# Patient Record
(demographics unavailable — no encounter records)

---

## 2024-11-26 NOTE — HISTORY OF PRESENT ILLNESS
[FreeTextEntry1] : History of present illness 52-year-old Bengali-speaking female patient with a history of hypertension who had negative EKG stress test in 2019 (8 METS), as well as echocardiogram 11/2021 showing severe TR with normal RVSP (unchanged from 2019). To assess right-sided pathology, she underwent cardiac MRI 09/2019 showing no shunt and dilated RA and RV. Right heart catheterization 02/2022 confirmed normal PA pressures. Patient has been experiencing heart palpitations for the past month, which have become more frequent in the last two weeks. The palpitations occur daily and can last for varying durations, from seconds to hours. The patient also reports episodes of shortness of breath and feeling faint, but no loss of consciousness. The palpitations can occur at any time, and the patient has woken up in the middle of the night feeling short of breath. The patient also reports feeling short of breath when climbing stairs.  Past medical history Hypertension  Review of Symptoms  Constitutional, Visual, Respiratory, Cardiovascular, Gastrointestinal, Genitourinary, Neurologic, Integumentary, Endocrine, Musculoskeletal, Psychiatric, and Hematologic systems reviewed and are all negative except as in HPI.  Family history No family history of premature CAD  Social history No smoking or alcohol use  Current medications Rosuvastatin 10mg for cholesterol  Physical exam EXTREMITIES: Mild edema in feet.  Unless superseded by above, rest of exam was normal including  General: No acute distress HEENT: EOMI Neck: Supple, No JVD, no bruits Lungs: Clear to auscultation bilaterally; No rales or wheezing Heart: Regular rate and rhythm; No murmurs, rubs, or gallops Abdomen: Nontender, bowel sounds present Extremities: No clubbing, cyanosis, or edema Nervous system: Alert & Oriented X3, no focal deficits Psychiatric: Normal affect Skin: No rashes or lesions.  Assessment 52-year-old female patient with a history of hypertension. The patient's symptoms of heart palpitations and dyspnea could be due to an arrhythmia or heightened sensitivity to normal cardiac rhythms. The patient's previous echocardiogram showed cardiac hypertrophy, which could potentially cause an arrhythmia.  Plan 1. Arrhythmia: Recommend a Holter monitor for three days to record cardiac rhythm during episodes of palpitations. 2. Structural heart changes: Recommend a repeat echocardiogram to evaluate for any structural changes in the heart. 3. Hyperlipidemia: Patient stopped taking her statin. Pending repeat lipid panel by PMD; she will need her ASCVD score calculated afterwards to assess whether she should be on a statin.  Patient's phone number: [phone], with permission to leave voicemail.  Prescription Continue with current medication, rosuvastatin 10mg for cholesterol.  Appointments - Echocardiogram to be scheduled at [facility]. - Heart monitor to be fitted in this building, pending insurance authorization.  This note was generated using ambient listening software. A reasonable effort has been made for proofreading its contents, but typos may still remain. If there are any questions or points of clarification needed, please notify the office.